# Patient Record
(demographics unavailable — no encounter records)

---

## 2024-10-08 NOTE — DATA REVIEWED
[FreeTextEntry1] : I ordered, reviewed and interpreted today's audiometric testing myself and discussed the results with the family.  My interpretation is in keeping with:   Right ear: normal hearing, type A tympanogram Left ear: normal hearing, type A tympanogram

## 2024-10-08 NOTE — HISTORY OF PRESENT ILLNESS
[de-identified] : 2 year old female presents for initial evaluation of snoring  Snoring at night - NO choking gasping or witnessed apneas  No noisy breathing when awake  Nasal congestion only when sick  Does not get throat or ear infections often  Eating and drinking well, no trouble swallowing  Has speech delay, due to get evaluated by speech  No parental concerns for hearing loss but mom wants hearing evaluated

## 2024-11-06 NOTE — HISTORY OF PRESENT ILLNESS
[de-identified] : Cough for 1 week [FreeTextEntry6] : Fever started today- highest today was 101.7 NO sick contacts No vomiting or diarrhea Normal urine and stools Complained of right ear pain for 1 day Cough is waking her at night; persistent coughing

## 2024-11-06 NOTE — PHYSICAL EXAM
[Erythema] : erythema [Crackles] : crackles [Rhonchi] : rhonchi [NL] : warm, clear [Clear] : left tympanic membrane not clear [Wheezing] : no wheezing [FreeTextEntry7] : Upper lobes bilaterally with rhonchi and left side crackles

## 2024-11-08 NOTE — HISTORY OF PRESENT ILLNESS
[de-identified] : Coughing [FreeTextEntry6] : Parent is here for follow up of pneumonia and cough She is noted to have slight improvement in cough since starting the steroids + RSV On recent viral panel No vomiting or diarrhea No rash Normal urine and normal stools Normal activity level.

## 2024-11-08 NOTE — DISCUSSION/SUMMARY
[FreeTextEntry1] : 1) BRONCHIOLITIS with wheezing and cough- starting albuterol neb every 4 hours prn wheezing. Monitor for any worsening and have her go to the ER if any persistent wheezing or worsening symptoms. She received 1 treatment of albuterol here in the office and tolerated it well. Also refilled prednisolone for 2 more days (total 5-day course). Parent was given a nebulizer machine and tubing in office today to continue treatments at home. In addition, albuterol vials sent to pharmacy today.  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 2) Underlying pneumonia- on 3rd day of Zithromax- no longer having fever. Normal sats today Finish course of Zithromax. Mycoplasma was negative.

## 2024-11-08 NOTE — PHYSICAL EXAM
[Wheezing] : wheezing [Crackles] : no crackles [Transmitted Upper Airway Sounds] : no transmitted upper airway sounds [Rhonchi] : rhonchi [NL] : warm, clear

## 2024-12-04 NOTE — PHYSICAL EXAM
[General Appearance - Alert] : alert [General Appearance - In No Acute Distress] : in no acute distress [General Appearance - Well Nourished] : well nourished [General Appearance - Well Developed] : well developed [General Appearance - Well-Appearing] : well appearing [Appearance Of Head] : the head was normocephalic [Facies] : there were no dysmorphic facial features [Sclera] : the conjunctiva were normal [Outer Ear] : the ears and nose were normal in appearance [Examination Of The Oral Cavity] : mucous membranes were moist and pink [Auscultation Breath Sounds / Voice Sounds] : breath sounds clear to auscultation bilaterally [Normal Chest Appearance] : the chest was normal in appearance [Apical Impulse] : quiet precordium with normal apical impulse [Heart Rate And Rhythm] : normal heart rate and rhythm [Heart Sounds] : normal S1 and S2 [Heart Sounds Gallop] : no gallops [Heart Sounds Pericardial Friction Rub] : no pericardial rub [Edema] : no edema [Arterial Pulses] : normal upper and lower extremity pulses with no pulse delay [Heart Sounds Click] : no clicks [Capillary Refill Test] : normal capillary refill [Bowel Sounds] : normal bowel sounds [Abdomen Soft] : soft [Nondistended] : nondistended [Abdomen Tenderness] : non-tender [Nail Clubbing] : no clubbing  or cyanosis of the fingers [Motor Tone] : normal muscle strength and tone [Cervical Lymph Nodes Enlarged Anterior] : The anterior cervical nodes were normal [Cervical Lymph Nodes Enlarged Posterior] : The posterior cervical nodes were normal [] : no rash [Skin Lesions] : no lesions [Skin Turgor] : normal turgor [Demonstrated Behavior - Infant Nonreactive To Parents] : interactive [Mood] : mood and affect were appropriate for age [Demonstrated Behavior] : normal behavior [Systolic] : systolic [I] : a grade 1/6  [LUSB] : LUSB

## 2024-12-04 NOTE — CONSULT LETTER
[Today's Date] : [unfilled] [Name] : Name: [unfilled] [] : : ~~ [Today's Date:] : [unfilled] [Dear  ___:] : Dear Dr. [unfilled]: [Consult] : I had the pleasure of evaluating your patient, [unfilled]. My full evaluation follows. [Consult - Single Provider] : Thank you very much for allowing me to participate in the care of this patient. If you have any questions, please do not hesitate to contact me. [Sincerely,] : Sincerely, [FreeTextEntry4] : Dr. Kenyatta Landry, DO [FreeTextEntry5] : [address] [FreeTextEntry6] : [place] [address] [de-identified] : Castro Hussein MD, FAAP  and Systems Chief, Pediatric Cardiology [facility] [address], [address] [place] [address] Office: [phone] Fax: [phone]

## 2024-12-04 NOTE — CARDIOLOGY SUMMARY
[Today's Date] : [unfilled] [FreeTextEntry1] : Normal sinus rhythm, leftward QRS axis, normal intervals, possible RVH  no pre-excitation, no ST segment or T wave abnormalities. [FreeTextEntry2] :  1. {S,D,S\} Situs solitus, D-ventricular looping, normally related great arteries. 2. Normal study. 3. Normal right ventricular morphology with qualitatively normal size and systolic function. 4. Normal left ventricular size, morphology and systolic function. 5. No pericardial effusion.

## 2024-12-04 NOTE — HISTORY OF PRESENT ILLNESS
[FreeTextEntry1] : I had the pleasure of seeing JEISON ALBA in the cardiology office on 12/04/2024 for an initial consultation. As you know, JEISON is a 2 year old girl who was referred to cardiology for a heart murmur. The murmur was first diagnosed during a routine pediatric visit. JEISON was not ill or febrile at the time of that visit.   She has been thriving at home, has been feeding without difficulty, and has been gaining weight and developing appropriately.   There has been no tachypnea, increased work of breathing, cyanosis, excessive diaphoresis, unexplained irritability, or syncope.   There is no history of sudden early death, syncope, pacemakers cardiomyopathy or heart transplant or other early onset CV issues in the family.

## 2024-12-04 NOTE — DISCUSSION/SUMMARY
[FreeTextEntry1] : In summary, JEISON is a 2 year old female, was referred for cardiac evaluation of a murmur. The cardiac physical examination was notable for a pulmonary flow murmur as well as venous hum. Her ECG had some non-specific findings so an echocardiogram was obtained and was normal. Overall she has an innocent murmur and an innocent venous hum.  Innocent murmurs are not related to cardiac pathology, and may get louder during times of illness or fever. They may resolve, or they may persist throughout life, but are of no clinical consequence. The family verbalized understanding, and all questions were answered.   From a cardiac standpoint there are no physical limitations, no contraindications to any medications she should require, no cardiac contraindications to anesthesia or surgical procedures, and no requirement for SBE prophylaxis prior to procedures.   From a CV perspective all routine vaccinations including flu vaccination are recommended  No further cardiology follow-up is required, although we would be happy to see JEISON back at any time should questions or concerns arise. In general we recommend that if the murmur is still heard at school age that they return for a repeat visit.  [Needs SBE Prophylaxis] : [unfilled] does not need bacterial endocarditis prophylaxis [May participate in all age-appropriate activities] : [unfilled] May participate in all age-appropriate activities. [Influenza vaccine is recommended] : Influenza vaccine is recommended

## 2025-01-21 NOTE — PHYSICAL EXAM
[Acute Distress] : no acute distress [Clear] : right tympanic membrane clear [Clear Rhinorrhea] : clear rhinorrhea [Erythematous Oropharynx] : erythematous oropharynx [Clear to Auscultation Bilaterally] : clear to auscultation bilaterally [NL] : warm, clear [FreeTextEntry7] : Productive cough

## 2025-01-21 NOTE — HISTORY OF PRESENT ILLNESS
[FreeTextEntry6] : 1 y/o patient presents with coughing, runny nose, nausea, vomiting, and sneezing. Fever highest 100. Motrin given at home. Afebrile in office. Patient is a 2-year-old female with URI symptoms consisting of cough, nasal discharge and congestion on and off for the past 4 to 5 days, she has also had fever low-grade for 2 days.  Had 1 episode of vomiting but that since resolved.  Fever responded to Motrin.  Appetite decreased.  Has history of reactive airways disease but has been stable without any exacerbations,

## 2025-01-21 NOTE — REVIEW OF SYSTEMS
[Fever] : fever [Ear Tugging] : no ear tugging [Nasal Discharge] : nasal discharge [Nasal Congestion] : nasal congestion [Cough] : cough [Congestion] : congestion [Appetite Changes] : appetite changes [Negative] : Genitourinary

## 2025-01-21 NOTE — DISCUSSION/SUMMARY
[FreeTextEntry1] : 2-year-old female with viral URI with cough.  Decreased appetite pharyngitis on exam..  Reactive airways disease stable without exacerbation.   -advised treatment of URI by using normal saline drops with nasal suctioning, humidifier, steam, and increasing fluids. If wheezing begins May use albuterol/ Rapid strep test was done and was negative.  Throat culture sent to lab. PCR done for flu, COVID, RSV. All questions answered. Return to office if symptoms progress. Total time dedicated to this patient visit including preparing to see the patient(e.g. review of chart, any pertinent labs etc.) obtaining  and or reviewing separately obtained history,performing medical exam,evaluation,counseling and educating patient and parent,ordering any needed medications or labs,documenting clinical information in the electronic medical record to patient/parent ----25---minutes

## 2025-03-05 NOTE — HISTORY OF PRESENT ILLNESS
[Mother] : mother [Normal] : Normal [In bed] : In bed [Wakes up at night] : Wakes up at night [Yes] : Patient goes to dentist yearly [Vitamin] : Primary Fluoride Source: Vitamin [Playtime (60 min/d)] : Playtime 60 min a day [No] : No cigarette smoke exposure [Water heater temperature set at <120 degrees F] : Water heater temperature set at <120 degrees F [Car seat in back seat] : Car seat in back seat [Carbon Monoxide Detectors] : Carbon monoxide detectors [Smoke Detectors] : Smoke detectors [Supervised play near cars and streets] : Supervised play near cars and streets [LastFluorideTreatment] : feb 2025  [FreeTextEntry1] : Speech therapy 45 mins  2 z weeks   done at home  ENT  visit-  recommend sleep study  in prrogress Cardio-  no murmer

## 2025-03-05 NOTE — DEVELOPMENTAL MILESTONES
[Normal Development] : Normal Development [Plays pretend with toys or dolls] : plays pretend with toys or dolls [Pokes food with fork] : pokes food with fork [Uses pronouns correctly] : uses pronouns correctly [Explains the reason for things,] : explains the reason for things, such as needing a sweater when it's cold [Names at least one color] : names at least one color [Walks up steps, using one] : walks up steps, using one foot, then the other [Runs well without falling] : runs well without falling [Grasps crayon with thumb] : grasps crayon with thumb and fingers instead of fist [Catches a large ball] : catches a large ball [Copies a vertical line] : copies a vertical line [Yes] : Completed. [Urinates in a potty or toilet] : does not urinate in a potty or toilet

## 2025-03-05 NOTE — PHYSICAL EXAM

## 2025-03-05 NOTE — DISCUSSION/SUMMARY
[Normal Growth] : growth [Normal Development] : development [None] : No known medical problems [No Elimination Concerns] : elimination [No Feeding Concerns] : feeding [No Skin Concerns] : skin [Normal Sleep Pattern] : sleep [No Medications] : ~He/She~ is not on any medications [Parent/Guardian] : parent/guardian [] : The components of the vaccine(s) to be administered today are listed in the plan of care. The disease(s) for which the vaccine(s) are intended to prevent and the risks have been discussed with the caretaker.  The risks are also included in the appropriate vaccination information statements which have been provided to the patient's caregiver.  The caregiver has given consent to vaccinate. [FreeTextEntry1] : 2 year female here for well-visit, appropriate growth and development observed. Continue cow's milk. Continue table foods, 3 meals with 2-3 snacks per day. Incorporate flourinated water daily in a sippy cup. Brush teeth twice a day with soft toothbrush. Recommend visit to dentist. When in car, keep child in rear-facing car seats until age 2, or until  the maximum height and weight for seat is reached. Put toddler to sleep in own bed. Help toddler to maintain consistent daily routines and sleep schedule. Toilet training discussed. Ensure home is safe. Use consistent, positive discipline. Read aloud to toddler. Limit screen time to no more than 2 hours per day.

## 2025-05-27 NOTE — PHYSICAL EXAM
[Acute Distress] : no acute distress [Clear] : right tympanic membrane clear [Clear Rhinorrhea] : clear rhinorrhea [Inflamed Tongue] : inflamed tongue [Vesicles] : vesicles present [Exudate] : exudate [Ulcerative Lesions] : ulcerative lesions [Clear to Auscultation Bilaterally] : clear to auscultation bilaterally [NL] : warm, clear

## 2025-05-27 NOTE — DISCUSSION/SUMMARY
[FreeTextEntry1] : 3 yo female with gingivostomatitis due to possible coxsackie or herpes. Advised fluids, ice pops,cold items such as yogurt. Tylenol or motrin for discomfort. Patient also has blisters on tongue and thick white coating. Most likely this is due to illness. Trial of Nystatin prescribed. Reviewed previous notes regarding ENT . Sleep study report reviewed with mother. Patient has obstructive sleep apnea moderate. Advised f/u with ENT to evaluate tonsils and adenoids. Total time dedicated to this patient visit including preparing to see the patient(e.g. review of chart, any pertinent labs etc.) obtaining  and or reviewing separately obtained history,performing medical exam,evaluation,counseling and educating patient and parent,ordering any needed medications or labs,documenting clinical information in the electronic medical record to patient/parent -----30--minutes

## 2025-05-27 NOTE — REVIEW OF SYSTEMS
[Fever] : fever [Nasal Discharge] : nasal discharge [Nasal Congestion] : nasal congestion [Swollen Gums] : swollen gums [Appetite Changes] : appetite changes [Negative] : Genitourinary

## 2025-05-27 NOTE — HISTORY OF PRESENT ILLNESS
[FreeTextEntry6] : 1 y/o patient presents with white patch on tongue. Mother stated patient complains of pain when eating. Patient is afebrile in office. Mother states child had a temperature 3 days ago but that resolved..  Currently she is not on any medications.  Mother noted coating on tongue 2 days ago.  Patient seems to be having difficulty eating because of discomfort. Mother is also concerned about results of sleep study.